# Patient Record
Sex: MALE | Race: WHITE | Employment: UNEMPLOYED | URBAN - NONMETROPOLITAN AREA
[De-identification: names, ages, dates, MRNs, and addresses within clinical notes are randomized per-mention and may not be internally consistent; named-entity substitution may affect disease eponyms.]

---

## 2019-11-25 ENCOUNTER — OFFICE VISIT (OUTPATIENT)
Dept: PRIMARY CARE CLINIC | Age: 54
End: 2019-11-25
Payer: COMMERCIAL

## 2019-11-25 VITALS
WEIGHT: 315 LBS | SYSTOLIC BLOOD PRESSURE: 136 MMHG | HEART RATE: 107 BPM | DIASTOLIC BLOOD PRESSURE: 82 MMHG | BODY MASS INDEX: 46.65 KG/M2 | HEIGHT: 69 IN | OXYGEN SATURATION: 98 % | RESPIRATION RATE: 16 BRPM

## 2019-11-25 DIAGNOSIS — Z00.00 WELLNESS EXAMINATION: Primary | ICD-10-CM

## 2019-11-25 DIAGNOSIS — M25.562 ACUTE PAIN OF LEFT KNEE: ICD-10-CM

## 2019-11-25 DIAGNOSIS — M25.571 CHRONIC PAIN OF BOTH ANKLES: ICD-10-CM

## 2019-11-25 DIAGNOSIS — M25.572 CHRONIC PAIN OF BOTH ANKLES: ICD-10-CM

## 2019-11-25 DIAGNOSIS — G89.29 CHRONIC PAIN OF BOTH ANKLES: ICD-10-CM

## 2019-11-25 PROCEDURE — 96160 PT-FOCUSED HLTH RISK ASSMT: CPT | Performed by: FAMILY MEDICINE

## 2019-11-25 PROCEDURE — 99213 OFFICE O/P EST LOW 20 MIN: CPT | Performed by: FAMILY MEDICINE

## 2019-11-25 PROCEDURE — 99396 PREV VISIT EST AGE 40-64: CPT | Performed by: FAMILY MEDICINE

## 2019-11-25 ASSESSMENT — PATIENT HEALTH QUESTIONNAIRE - PHQ9
SUM OF ALL RESPONSES TO PHQ QUESTIONS 1-9: 18
3. TROUBLE FALLING OR STAYING ASLEEP: 3
4. FEELING TIRED OR HAVING LITTLE ENERGY: 3
7. TROUBLE CONCENTRATING ON THINGS, SUCH AS READING THE NEWSPAPER OR WATCHING TELEVISION: 0
8. MOVING OR SPEAKING SO SLOWLY THAT OTHER PEOPLE COULD HAVE NOTICED. OR THE OPPOSITE, BEING SO FIGETY OR RESTLESS THAT YOU HAVE BEEN MOVING AROUND A LOT MORE THAN USUAL: 0
5. POOR APPETITE OR OVEREATING: 3
SUM OF ALL RESPONSES TO PHQ QUESTIONS 1-9: 18
2. FEELING DOWN, DEPRESSED OR HOPELESS: 3
1. LITTLE INTEREST OR PLEASURE IN DOING THINGS: 3
10. IF YOU CHECKED OFF ANY PROBLEMS, HOW DIFFICULT HAVE THESE PROBLEMS MADE IT FOR YOU TO DO YOUR WORK, TAKE CARE OF THINGS AT HOME, OR GET ALONG WITH OTHER PEOPLE: 0
9. THOUGHTS THAT YOU WOULD BE BETTER OFF DEAD, OR OF HURTING YOURSELF: 0
6. FEELING BAD ABOUT YOURSELF - OR THAT YOU ARE A FAILURE OR HAVE LET YOURSELF OR YOUR FAMILY DOWN: 3
SUM OF ALL RESPONSES TO PHQ9 QUESTIONS 1 & 2: 6

## 2022-10-28 NOTE — PROGRESS NOTES
NEW PRIMARY CARE VISIT    10/31/22  Name: Gian Robertson   : 1965   Age: 49 y.o.   Sex: male        Assessment & Plan:     Problem List Items Addressed This Visit          Circulatory    Elevated blood pressure reading     No personal or family history HTN  Counseled on weight loss  Defer treatment at this time  Patient would be interested in lifestyle changes first  Recheck next visit            Digestive    Chronic diarrhea     Associated with urgency and sometimes incontinence  Check labs  Failed Colestid in past  Referral back to GI         Relevant Orders    External Referral To Gastroenterology    Comprehensive Metabolic Panel, Fasting    Lipase    CBC    TSH       Nervous and Auditory    Chronic right-sided low back pain with left-sided sciatica     History of CSI  Interested in seeing pain management after workup for abdominal pain            Musculoskeletal and Integument    Rosacea     Patient to call with medication name for refill         Relevant Orders    Mikhail Rodriguez DO, Dermatology, [place] ([facility])    Skin lesion of face     New, growing  Referral to derm for biopsy         Relevant Orders    Mikhail Rodriguez DO, Dermatology, [place] ([facility])       Other    Chronic LUQ pain     Chronic bloating  New LUQ pain for past several months  Check labs  Given history of chronic diarrhea and concern for gastritis/PUD/hiatal hernia, referral to GI  Trial omeprazole 40 mg dialy         Relevant Medications    omeprazole (PRILOSEC) 40 MG delayed release capsule    Other Relevant Orders    External Referral To Gastroenterology    Comprehensive Metabolic Panel, Fasting    Lipase    CBC    History of colon polyps     Repeat colonoscopy due          Relevant Orders    External Referral To Gastroenterology    Class 3 severe obesity due to excess calories without serious comorbidity with body mass index (BMI) of 50.0 to 59.9 in [facility])     Patient would like to discuss weight further once his abdominal pain is worked up  Check screening labs         Relevant Orders    Comprehensive Metabolic Panel, Fasting    Lipid, Fasting    Hemoglobin A1C    Cigarette nicotine dependence, uncomplicated     Cut down to 1ppd  Discuss cessation further after workup for abdominal pain  Check CT lung cancer screening         Relevant Orders    DC VISIT TO DISCUSS LUNG CA SCREEN W LDCT (Completed)    CT Lung Screen (Annual)    Left leg swelling     Chronic with skin changes  Unable to fully examine today due to patient's pants  Suspect venous insufficiency  Referral to derm  Consider referral to wound care  Discussed need for weight loss          Other Visit Diagnoses       Encounter to establish care with new doctor    -  Primary    History reviewed and updated    Screening for lung cancer        Relevant Orders    DC VISIT TO DISCUSS LUNG CA SCREEN W LDCT (Completed)    CT Lung Screen (Annual)    Screening for lipid disorders        Relevant Orders    Lipid, Fasting    Screening for diabetes mellitus (DM)        Relevant Orders    Comprehensive Metabolic Panel, Fasting    Hemoglobin A1C    Hyperglycemia        Relevant Orders    Hemoglobin A1C    Need for influenza vaccination        Relevant Orders    Influenza, FLUCELVAX, (age 10 mo+), IM, PF, 0.5 mL (Completed)          Counseled patient regarding above diagnosis, including possible risks and complications, especially if left uncontrolled. Counseled patient as appropriate and relevant regarding any possible side effects, risks, and alternatives to treatment; the patient verbalizes understanding, and is in agreement with the plan as detailed above. All educational materials and instructions were discussed and included on the After Visit Summary. All questions answered to the patient's satisfaction. The patient was advised to call or send [employer] message for any concerns prior to next appointment. Return in about 3 months (around 1/31/2023) for follow-up.     46 minutes was spent precharting, face-to-face with patient, and documenting on day of encounter. Subjective:     Chief Complaint   Patient presents with    New Patient       Patient needs new PCP. Patient reports \"stomach issues\". Has had bloating intermittently for past 4 hours. Now having intermittent LUQ abdominal pain radiating into middle back. Pain is aching in nature. Does not burn. Denies reflux. Pain gets worse with hunger but does not have any specific trigger. Usual pattern is early in the morning, gets better as day goes on. No relieving factors. Saw GI in past for colon cancer screening. Was also prescribed Colestid for chronic diarrhea. Did not help. Reports new lesion left face. Had derm in past for rosacea. Was on cream, cannot remember name. Review of Systems   Constitutional:  Negative for chills, diaphoresis, fatigue and fever. HENT:  Negative for trouble swallowing. Eyes:  Negative for visual disturbance. Respiratory:  Negative for shortness of breath. Cardiovascular:  Positive for leg swelling (chronic L>R). Negative for chest pain. Gastrointestinal:  Positive for abdominal distention, abdominal pain, diarrhea and nausea. Negative for blood in stool, constipation and vomiting. Positive for chronic stool urgency and incontinence. Genitourinary:  Negative for decreased urine volume, difficulty urinating and dysuria. Neurological:  Negative for light-headedness and headaches. Medical History:   History reviewed and updated as needed.     Patient Active Problem List   Diagnosis    Chronic right-sided low back pain with left-sided sciatica    Chronic LUQ pain    Chronic diarrhea    History of colon polyps    Rosacea    Class 3 severe obesity due to excess calories without serious comorbidity with body mass index (BMI) of 50.0 to 59.9 in adult [facility])    Skin lesion of face     Past Medical History:   Diagnosis Date    Back problem      Past Surgical History: Procedure Laterality Date    ANKLE SURGERY Right     due to instability    MOUTH SURGERY       Family History   Problem Relation Age of Onset    Kidney Disease Daughter     No Known Problems Son     Cancer Maternal Aunt      Medications:     Current Outpatient Medications:     albuterol (PROAIR HFA) 108 (90 BASE) MCG/ACT inhaler, Inhale 2 puffs into the lungs every 6 hours as needed for Wheezing for up to 7 days. , Disp: 1 Inhaler, Rfl: 0    naproxen (NAPROSYN) 500 MG tablet, Take 1 tablet by mouth 2 times daily (with meals) for 3 days. , Disp: 20 tablet, Rfl: 0    Allergies:   No Known Allergies    Social History:     Social History     Socioeconomic History    Marital status:      Spouse name: Not on file    Number of children: Not on file    Years of education: Not on file    Highest education level: Not on file   Occupational History    Not on file   Tobacco Use    Smoking status: Every Day     Packs/day: 1.00     Types: Cigarettes    Smokeless tobacco: Former   Substance and Sexual Activity    Alcohol use: Yes     Comment: rare    Drug use: No    Sexual activity: Not on file   Other Topics Concern    Not on file   Social History Narrative    Not on file     Social Determinants of Health     Financial Resource Strain: Not on file   Food Insecurity: Not on file   Transportation Needs: Not on file   Physical Activity: Not on file   Stress: Not on file   Social Connections: Not on file   Intimate Partner Violence: Not on file   Housing Stability: Not on file     Physical Exam:     Vitals:    10/31/22 0826 10/31/22 0916   BP: (!) 158/92 (!) 142/88   Site:  Left Upper Arm   Position:  Sitting   Cuff Size:  Large Adult   Pulse: (!) 124 84   Resp: 20    Temp: 98.1 °F (36.7 °C)    TempSrc: Temporal    SpO2: 95%    Height: 5' 9\" (1.753 m)      BP Readings from Last 3 Encounters:   10/31/22 (!) 142/88   11/25/19 136/82     Wt Readings from Last 3 Encounters:   11/25/19 (!) 354 lb (160.6 kg)      Physical Exam  Vitals and nursing note reviewed. Constitutional:       General: He is not in acute distress. Appearance: Normal appearance. He is obese. He is not ill-appearing or diaphoretic. Cardiovascular:      Rate and Rhythm: Normal rate and regular rhythm. Heart sounds: Normal heart sounds. Pulmonary:      Effort: Pulmonary effort is normal. No respiratory distress. Breath sounds: Normal breath sounds. Abdominal:      General: Abdomen is protuberant. Bowel sounds are normal. There is distension. Palpations: Abdomen is soft. There is no hepatomegaly, splenomegaly or mass. Tenderness: There is abdominal tenderness in the periumbilical area. There is no guarding or rebound. Hernia: No hernia is present. Musculoskeletal:      Right lower le+ Edema present. Left lower le+ Edema present. Comments: Left lower extremity with swelling and skin thickening mid-leg above knee. Skin:     General: Skin is warm and dry. Comments: 1 cm macule left temple. Neurological:      Mental Status: He is alert and oriented to person, place, and time. Psychiatric:         Mood and Affect: Mood normal.         Behavior: Behavior normal.     Testing:     Orders Placed This Encounter   Procedures    CT Lung Screen (Annual)     Age: Patient is 49 y.o.     Smoking History: Social History    Tobacco Use      Smoking status: Every Day        Packs/day: 1.00        Years: 40.00        Pack years: 40        Types: Cigarettes      Smokeless tobacco: Former      Tobacco comments: total smoking history 40 years; 2ppd for 10 years; currently down to 1ppd    Alcohol use: Not Currently      Comment: rare    Drug use: Yes      Types: Marijuana (Weed)   Pack years: 40    Date of last lung cancer screening: No previous lung cancer screening exam     Standing Status:   Future     Standing Expiration Date:   2024     Scheduling Instructions:      [facility]     Order Specific Question:   Is there documentation of shared decision making? Answer:   Yes     Order Specific Question:   Is this a low dose CT or a routine CT? Answer:   Low Dose CT [1]     Order Specific Question:   Is this the first (baseline) CT or an annual exam?     Answer:   Baseline [1]     Order Specific Question:   Does the patient show any signs or symptoms of lung cancer? Answer:   No     Order Specific Question:   Smoking Status? Answer:   Every Day [1]     Order Specific Question:   Smoking packs per day? Answer:   1     Order Specific Question:   Years smoking? Answer:   40    Influenza, FLUCELVAX, (age 10 mo+), IM, PF, 0.5 mL    Comprehensive Metabolic Panel, Fasting     Standing Status:   Future     Standing Expiration Date:   10/31/2023    Lipid, Fasting     Standing Status:   Future     Standing Expiration Date:   10/31/2023    Lipase     Standing Status:   Future     Standing Expiration Date:   10/31/2023    CBC     Standing Status:   Future     Standing Expiration Date:   10/31/2023    TSH     Standing Status:   Future     Standing Expiration Date:   10/31/2023    Hemoglobin A1C     Standing Status:   Future     Standing Expiration Date:   10/31/2023    External Referral To Gastroenterology     Referral Priority:   Routine     Referral Type:   Eval and Treat     Referral Reason:   Specialty Services Required     Requested Specialty:   Gastroenterology     Number of Visits Requested:   1    Renay Lin DO, [facility] ([facility])     Referral Priority:   Routine     Referral Type:   Eval and Treat     Referral Reason:   Specialty Services Required     Referred to Provider:   Noy Dill DO     Requested Specialty:   Dermatology     Number of Visits Requested:   1    [place] VISIT TO DISCUSS LUNG CA SCREEN W LDCT     No results found for this or any previous visit (from the past 24 hour(s)).     Discussed with the patient the current USPSTF guidelines released March 9, 2021 for screening for lung cancer. For adults aged 48 to [de-identified] years who have a 20 pack-year smoking history and currently smoke or have quit within the past 15 years the grade B recommendation is to:  Screen for lung cancer with low-dose computed tomography (LDCT) every year. Stop screening once a person has not smoked for 15 years or has a health problem that limits life expectancy or the ability to have lung surgery. The patient  reports that he has been smoking cigarettes. He has a 40.00 pack-year smoking history. He has quit using smokeless tobacco.. Discussed with patient the risks and benefits of screening, including over-diagnosis, false positive rate, and total radiation exposure. The patient currently exhibits no signs or symptoms suggestive of lung cancer. Discussed with patient the importance of compliance with yearly annual lung cancer screenings and willingness to undergo diagnosis and treatment if screening scan is positive. In addition, the patient was counseled regarding the importance of remaining smoke free and/or total smoking cessation.     Also reviewed the following if the patient has Medicare that as of February 10, 2022, Medicare only covers LDCT screening in patients aged 51-72 with at least a 20 pack-year smoking history who currently smoke or have quit in the last 15 years

## 2022-10-31 ENCOUNTER — OFFICE VISIT (OUTPATIENT)
Dept: PRIMARY CARE CLINIC | Age: 57
End: 2022-10-31
Payer: COMMERCIAL

## 2022-10-31 VITALS
HEART RATE: 84 BPM | SYSTOLIC BLOOD PRESSURE: 142 MMHG | OXYGEN SATURATION: 95 % | BODY MASS INDEX: 52.28 KG/M2 | DIASTOLIC BLOOD PRESSURE: 88 MMHG | HEIGHT: 69 IN | TEMPERATURE: 98.1 F | RESPIRATION RATE: 20 BRPM

## 2022-10-31 DIAGNOSIS — Z13.1 SCREENING FOR DIABETES MELLITUS (DM): ICD-10-CM

## 2022-10-31 DIAGNOSIS — L71.9 ROSACEA: ICD-10-CM

## 2022-10-31 DIAGNOSIS — G89.29 CHRONIC RIGHT-SIDED LOW BACK PAIN WITH LEFT-SIDED SCIATICA: ICD-10-CM

## 2022-10-31 DIAGNOSIS — R03.0 ELEVATED BLOOD PRESSURE READING: ICD-10-CM

## 2022-10-31 DIAGNOSIS — Z76.89 ENCOUNTER TO ESTABLISH CARE WITH NEW DOCTOR: Primary | ICD-10-CM

## 2022-10-31 DIAGNOSIS — Z12.2 SCREENING FOR LUNG CANCER: ICD-10-CM

## 2022-10-31 DIAGNOSIS — E66.01 CLASS 3 SEVERE OBESITY DUE TO EXCESS CALORIES WITHOUT SERIOUS COMORBIDITY WITH BODY MASS INDEX (BMI) OF 50.0 TO 59.9 IN ADULT (HCC): ICD-10-CM

## 2022-10-31 DIAGNOSIS — Z23 NEED FOR INFLUENZA VACCINATION: ICD-10-CM

## 2022-10-31 DIAGNOSIS — F17.210 CIGARETTE NICOTINE DEPENDENCE, UNCOMPLICATED: ICD-10-CM

## 2022-10-31 DIAGNOSIS — M54.42 CHRONIC RIGHT-SIDED LOW BACK PAIN WITH LEFT-SIDED SCIATICA: ICD-10-CM

## 2022-10-31 DIAGNOSIS — M79.89 LEFT LEG SWELLING: ICD-10-CM

## 2022-10-31 DIAGNOSIS — Z86.010 HISTORY OF COLON POLYPS: ICD-10-CM

## 2022-10-31 DIAGNOSIS — R10.12 CHRONIC LUQ PAIN: ICD-10-CM

## 2022-10-31 DIAGNOSIS — R73.9 HYPERGLYCEMIA: ICD-10-CM

## 2022-10-31 DIAGNOSIS — G89.29 CHRONIC LUQ PAIN: ICD-10-CM

## 2022-10-31 DIAGNOSIS — Z13.220 SCREENING FOR LIPID DISORDERS: ICD-10-CM

## 2022-10-31 DIAGNOSIS — L98.9 SKIN LESION OF FACE: ICD-10-CM

## 2022-10-31 DIAGNOSIS — K52.9 CHRONIC DIARRHEA: ICD-10-CM

## 2022-10-31 PROBLEM — Z86.0100 HISTORY OF COLON POLYPS: Status: ACTIVE | Noted: 2022-10-31

## 2022-10-31 PROBLEM — E66.813 CLASS 3 SEVERE OBESITY DUE TO EXCESS CALORIES WITHOUT SERIOUS COMORBIDITY WITH BODY MASS INDEX (BMI) OF 50.0 TO 59.9 IN ADULT (HCC): Status: ACTIVE | Noted: 2022-10-31

## 2022-10-31 PROCEDURE — 90471 IMMUNIZATION ADMIN: CPT | Performed by: STUDENT IN AN ORGANIZED HEALTH CARE EDUCATION/TRAINING PROGRAM

## 2022-10-31 PROCEDURE — G8482 FLU IMMUNIZE ORDER/ADMIN: HCPCS | Performed by: STUDENT IN AN ORGANIZED HEALTH CARE EDUCATION/TRAINING PROGRAM

## 2022-10-31 PROCEDURE — 4004F PT TOBACCO SCREEN RCVD TLK: CPT | Performed by: STUDENT IN AN ORGANIZED HEALTH CARE EDUCATION/TRAINING PROGRAM

## 2022-10-31 PROCEDURE — 90674 CCIIV4 VAC NO PRSV 0.5 ML IM: CPT | Performed by: STUDENT IN AN ORGANIZED HEALTH CARE EDUCATION/TRAINING PROGRAM

## 2022-10-31 PROCEDURE — G0296 VISIT TO DETERM LDCT ELIG: HCPCS | Performed by: STUDENT IN AN ORGANIZED HEALTH CARE EDUCATION/TRAINING PROGRAM

## 2022-10-31 PROCEDURE — 3017F COLORECTAL CA SCREEN DOC REV: CPT | Performed by: STUDENT IN AN ORGANIZED HEALTH CARE EDUCATION/TRAINING PROGRAM

## 2022-10-31 PROCEDURE — G8427 DOCREV CUR MEDS BY ELIG CLIN: HCPCS | Performed by: STUDENT IN AN ORGANIZED HEALTH CARE EDUCATION/TRAINING PROGRAM

## 2022-10-31 PROCEDURE — G8417 CALC BMI ABV UP PARAM F/U: HCPCS | Performed by: STUDENT IN AN ORGANIZED HEALTH CARE EDUCATION/TRAINING PROGRAM

## 2022-10-31 PROCEDURE — 99215 OFFICE O/P EST HI 40 MIN: CPT | Performed by: STUDENT IN AN ORGANIZED HEALTH CARE EDUCATION/TRAINING PROGRAM

## 2022-10-31 RX ORDER — OMEPRAZOLE 40 MG/1
40 CAPSULE, DELAYED RELEASE ORAL
Qty: 30 CAPSULE | Refills: 2 | Status: SHIPPED | OUTPATIENT
Start: 2022-10-31

## 2022-10-31 ASSESSMENT — ENCOUNTER SYMPTOMS
SHORTNESS OF BREATH: 0
ABDOMINAL PAIN: 1
ABDOMINAL DISTENTION: 1
VOMITING: 0
DIARRHEA: 1
TROUBLE SWALLOWING: 0
NAUSEA: 1
BLOOD IN STOOL: 0
CONSTIPATION: 0

## 2022-10-31 ASSESSMENT — PATIENT HEALTH QUESTIONNAIRE - PHQ9
SUM OF ALL RESPONSES TO PHQ QUESTIONS 1-9: 0
SUM OF ALL RESPONSES TO PHQ QUESTIONS 1-9: 0
SUM OF ALL RESPONSES TO PHQ9 QUESTIONS 1 & 2: 0
1. LITTLE INTEREST OR PLEASURE IN DOING THINGS: 0
DEPRESSION UNABLE TO ASSESS: FUNCTIONAL CAPACITY MOTIVATION LIMITS ACCURACY
2. FEELING DOWN, DEPRESSED OR HOPELESS: 0
SUM OF ALL RESPONSES TO PHQ QUESTIONS 1-9: 0
SUM OF ALL RESPONSES TO PHQ QUESTIONS 1-9: 0

## 2022-10-31 NOTE — ASSESSMENT & PLAN NOTE
No personal or family history HTN  Counseled on weight loss  Defer treatment at this time  Patient would be interested in lifestyle changes first  Recheck next visit

## 2022-10-31 NOTE — PATIENT INSTRUCTIONS
[address], [address]   [place], [email or web address]   [id number]  Address: [address], [address] [place] [address] Drive  Phone: [phone]

## 2022-10-31 NOTE — ASSESSMENT & PLAN NOTE
Chronic with skin changes  Unable to fully examine today due to patient's pants  Suspect venous insufficiency  Referral to derm  Consider referral to wound care  Discussed need for weight loss

## 2022-10-31 NOTE — ASSESSMENT & PLAN NOTE
Associated with urgency and sometimes incontinence  Check labs  Failed Colestid in past  Referral back to GI

## 2022-10-31 NOTE — ASSESSMENT & PLAN NOTE
Chronic bloating  New LUQ pain for past several months  Check labs  Given history of chronic diarrhea and concern for gastritis/PUD/hiatal hernia, referral to GI  Trial omeprazole 40 mg giovany

## 2022-10-31 NOTE — ASSESSMENT & PLAN NOTE
Patient would like to discuss weight further once his abdominal pain is worked up  Check screening labs

## 2022-10-31 NOTE — ASSESSMENT & PLAN NOTE
Cut down to 1ppd  Discuss cessation further after workup for abdominal pain  Check CT lung cancer screening

## 2022-11-02 ENCOUNTER — TELEPHONE (OUTPATIENT)
Dept: PRIMARY CARE CLINIC | Age: 57
End: 2022-11-02

## 2022-11-02 DIAGNOSIS — Z13.220 SCREENING FOR LIPID DISORDERS: ICD-10-CM

## 2022-11-02 DIAGNOSIS — R73.9 HYPERGLYCEMIA: ICD-10-CM

## 2022-11-02 DIAGNOSIS — R10.12 CHRONIC LUQ PAIN: ICD-10-CM

## 2022-11-02 DIAGNOSIS — G89.29 CHRONIC LUQ PAIN: ICD-10-CM

## 2022-11-02 DIAGNOSIS — Z13.1 SCREENING FOR DIABETES MELLITUS (DM): ICD-10-CM

## 2022-11-02 DIAGNOSIS — K52.9 CHRONIC DIARRHEA: ICD-10-CM

## 2022-11-02 DIAGNOSIS — E66.01 CLASS 3 SEVERE OBESITY DUE TO EXCESS CALORIES WITHOUT SERIOUS COMORBIDITY WITH BODY MASS INDEX (BMI) OF 50.0 TO 59.9 IN ADULT (HCC): ICD-10-CM

## 2022-11-02 DIAGNOSIS — L71.9 ROSACEA: Primary | ICD-10-CM

## 2022-11-02 LAB
ALBUMIN SERPL-MCNC: 4.2 G/DL (ref 3.5–5.2)
ALP BLD-CCNC: 86 U/L (ref 40–129)
ALT SERPL-CCNC: 13 U/L (ref 0–40)
ANION GAP SERPL CALCULATED.3IONS-SCNC: 11 MMOL/L (ref 7–16)
AST SERPL-CCNC: 21 U/L (ref 0–39)
BILIRUB SERPL-MCNC: 0.2 MG/DL (ref 0–1.2)
BUN BLDV-MCNC: 11 MG/DL (ref 6–20)
CALCIUM SERPL-MCNC: 9.4 MG/DL (ref 8.6–10.2)
CHLORIDE BLD-SCNC: 100 MMOL/L (ref 98–107)
CHOLESTEROL, FASTING: 139 MG/DL (ref 0–199)
CO2: 26 MMOL/L (ref 22–29)
CREAT SERPL-MCNC: 0.9 MG/DL (ref 0.7–1.2)
GFR SERPL CREATININE-BSD FRML MDRD: >60 ML/MIN/1.73
GLUCOSE FASTING: 89 MG/DL (ref 74–99)
HBA1C MFR BLD: 6 % (ref 4–5.6)
HCT VFR BLD CALC: 46.7 % (ref 37–54)
HDLC SERPL-MCNC: 44 MG/DL
HEMOGLOBIN: 14.9 G/DL (ref 12.5–16.5)
LDL CHOLESTEROL CALCULATED: 78 MG/DL (ref 0–99)
LIPASE: 29 U/L (ref 13–60)
MCH RBC QN AUTO: 28.9 PG (ref 26–35)
MCHC RBC AUTO-ENTMCNC: 31.9 % (ref 32–34.5)
MCV RBC AUTO: 90.7 FL (ref 80–99.9)
PDW BLD-RTO: 13.2 FL (ref 11.5–15)
PLATELET # BLD: 293 E9/L (ref 130–450)
PMV BLD AUTO: 10 FL (ref 7–12)
POTASSIUM SERPL-SCNC: 4.9 MMOL/L (ref 3.5–5)
RBC # BLD: 5.15 E12/L (ref 3.8–5.8)
SODIUM BLD-SCNC: 137 MMOL/L (ref 132–146)
TOTAL PROTEIN: 7.3 G/DL (ref 6.4–8.3)
TRIGLYCERIDE, FASTING: 87 MG/DL (ref 0–149)
TSH SERPL DL<=0.05 MIU/L-ACNC: 2.47 UIU/ML (ref 0.27–4.2)
VLDLC SERPL CALC-MCNC: 17 MG/DL
WBC # BLD: 8.5 E9/L (ref 4.5–11.5)

## 2022-11-03 NOTE — TELEPHONE ENCOUNTER
Please call patient to let him know that insurance formulary must have changed. Topical minocycline is not covered on his insurance currently. Metronidazole gel, also a topical antibiotic, is covered if he would like to try this. If this does not work for him, then we can apply for a prior authorization for the minocycline.

## 2022-11-04 NOTE — TELEPHONE ENCOUNTER
Patient does not want to try the Metronidazole gel. He is worried it will be greasy.  He stated he is waiting on the referral to a dermatologist and hopefully they will be able to find something else

## 2023-01-27 ENCOUNTER — OFFICE VISIT (OUTPATIENT)
Dept: PRIMARY CARE CLINIC | Age: 58
End: 2023-01-27
Payer: COMMERCIAL

## 2023-01-27 VITALS
SYSTOLIC BLOOD PRESSURE: 146 MMHG | HEIGHT: 69 IN | TEMPERATURE: 98.7 F | HEART RATE: 88 BPM | OXYGEN SATURATION: 98 % | WEIGHT: 315 LBS | RESPIRATION RATE: 20 BRPM | BODY MASS INDEX: 46.65 KG/M2 | DIASTOLIC BLOOD PRESSURE: 88 MMHG

## 2023-01-27 DIAGNOSIS — L71.9 ROSACEA: ICD-10-CM

## 2023-01-27 DIAGNOSIS — R73.03 PREDIABETES: ICD-10-CM

## 2023-01-27 DIAGNOSIS — K76.0 FATTY LIVER: ICD-10-CM

## 2023-01-27 DIAGNOSIS — R10.12 CHRONIC LUQ PAIN: ICD-10-CM

## 2023-01-27 DIAGNOSIS — Z86.010 HISTORY OF COLON POLYPS: ICD-10-CM

## 2023-01-27 DIAGNOSIS — E66.01 CLASS 3 SEVERE OBESITY DUE TO EXCESS CALORIES WITHOUT SERIOUS COMORBIDITY WITH BODY MASS INDEX (BMI) OF 50.0 TO 59.9 IN ADULT (HCC): ICD-10-CM

## 2023-01-27 DIAGNOSIS — G89.29 CHRONIC LUQ PAIN: ICD-10-CM

## 2023-01-27 DIAGNOSIS — I10 ESSENTIAL HYPERTENSION: Primary | ICD-10-CM

## 2023-01-27 DIAGNOSIS — K52.9 CHRONIC DIARRHEA: ICD-10-CM

## 2023-01-27 PROCEDURE — 3074F SYST BP LT 130 MM HG: CPT | Performed by: STUDENT IN AN ORGANIZED HEALTH CARE EDUCATION/TRAINING PROGRAM

## 2023-01-27 PROCEDURE — G8427 DOCREV CUR MEDS BY ELIG CLIN: HCPCS | Performed by: STUDENT IN AN ORGANIZED HEALTH CARE EDUCATION/TRAINING PROGRAM

## 2023-01-27 PROCEDURE — G8417 CALC BMI ABV UP PARAM F/U: HCPCS | Performed by: STUDENT IN AN ORGANIZED HEALTH CARE EDUCATION/TRAINING PROGRAM

## 2023-01-27 PROCEDURE — G8482 FLU IMMUNIZE ORDER/ADMIN: HCPCS | Performed by: STUDENT IN AN ORGANIZED HEALTH CARE EDUCATION/TRAINING PROGRAM

## 2023-01-27 PROCEDURE — 99214 OFFICE O/P EST MOD 30 MIN: CPT | Performed by: STUDENT IN AN ORGANIZED HEALTH CARE EDUCATION/TRAINING PROGRAM

## 2023-01-27 PROCEDURE — 3017F COLORECTAL CA SCREEN DOC REV: CPT | Performed by: STUDENT IN AN ORGANIZED HEALTH CARE EDUCATION/TRAINING PROGRAM

## 2023-01-27 PROCEDURE — 3078F DIAST BP <80 MM HG: CPT | Performed by: STUDENT IN AN ORGANIZED HEALTH CARE EDUCATION/TRAINING PROGRAM

## 2023-01-27 PROCEDURE — 4004F PT TOBACCO SCREEN RCVD TLK: CPT | Performed by: STUDENT IN AN ORGANIZED HEALTH CARE EDUCATION/TRAINING PROGRAM

## 2023-01-27 RX ORDER — METFORMIN HYDROCHLORIDE 500 MG/1
500 TABLET, EXTENDED RELEASE ORAL
Qty: 30 TABLET | Refills: 3 | Status: SHIPPED | OUTPATIENT
Start: 2023-01-27

## 2023-01-27 RX ORDER — AMLODIPINE BESYLATE 5 MG/1
5 TABLET ORAL DAILY
Qty: 30 TABLET | Refills: 3 | Status: SHIPPED | OUTPATIENT
Start: 2023-01-27

## 2023-01-27 ASSESSMENT — ENCOUNTER SYMPTOMS
ABDOMINAL DISTENTION: 1
ABDOMINAL PAIN: 1
DIARRHEA: 1
BLOOD IN STOOL: 0
SHORTNESS OF BREATH: 0
CONSTIPATION: 0
TROUBLE SWALLOWING: 0
NAUSEA: 1
VOMITING: 0

## 2023-01-27 ASSESSMENT — PATIENT HEALTH QUESTIONNAIRE - PHQ9
SUM OF ALL RESPONSES TO PHQ9 QUESTIONS 1 & 2: 2
1. LITTLE INTEREST OR PLEASURE IN DOING THINGS: 1
SUM OF ALL RESPONSES TO PHQ QUESTIONS 1-9: 2
SUM OF ALL RESPONSES TO PHQ QUESTIONS 1-9: 2
DEPRESSION UNABLE TO ASSESS: FUNCTIONAL CAPACITY MOTIVATION LIMITS ACCURACY
SUM OF ALL RESPONSES TO PHQ QUESTIONS 1-9: 2
SUM OF ALL RESPONSES TO PHQ QUESTIONS 1-9: 2
2. FEELING DOWN, DEPRESSED OR HOPELESS: 1

## 2023-01-27 NOTE — PROGRESS NOTES
ESTABLISHED PRIMARY CARE VISIT    23  Name: Loretto Homans   : 1965   Age: 56 y.o. Sex: male        Assessment & Plan:     Problem List Items Addressed This Visit          Circulatory    Essential hypertension - Primary     New diagnosis, remains elevated  Start amlodipine 5 mg daily         Relevant Medications    amLODIPine (NORVASC) 5 MG tablet       Digestive    Chronic diarrhea     Following with GI  Trial low FODMAP diet         Fatty liver     Following with GI            Musculoskeletal and Integument    Rosacea     Topical minocycline not covered  Trial topical metronidazole            Other    Chronic LUQ pain     Following with gastro  Trial low FODMAP diet         History of colon polyps     Following with GI  Colonoscopy due          Class 3 severe obesity due to excess calories without serious comorbidity with body mass index (BMI) of 50.0 to 59.9 in adult ([facility] [address]     Start metformin  Counseled on lifestyle changes         Relevant Medications    metFORMIN (GLUCOPHAGE XR) 500 MG extended release tablet    Prediabetes     Start metformin for weight loss         Relevant Medications    metFORMIN (GLUCOPHAGE XR) 500 MG extended release tablet     Counseled patient regarding above diagnosis, including possible risks and complications, especially if left uncontrolled. Counseled patient as appropriate and relevant regarding any possible side effects, risks, and alternatives to treatment; the patient verbalizes understanding, and is in agreement with the plan as detailed above. All educational materials and instructions were discussed and included on the After Visit Summary. All questions answered to the patient's satisfaction. The patient was advised to call or send [employer] message for any concerns prior to next appointment. Return in about 3 months (around 2023).     Subjective:     Chief Complaint   Patient presents with    Follow-up     Patient returns for follow-up  Had swallow study and US  Has follow-up next month to discuss results    Review of Systems   Constitutional:  Negative for chills, diaphoresis, fatigue and fever. HENT:  Negative for trouble swallowing. Eyes:  Negative for visual disturbance. Respiratory:  Negative for shortness of breath. Cardiovascular:  Positive for leg swelling (chronic left upper leg). Negative for chest pain. Gastrointestinal:  Positive for abdominal distention, abdominal pain (left upper and lower), diarrhea and nausea (rare). Negative for blood in stool, constipation and vomiting. Positive for chronic stool urgency and incontinence. Genitourinary:  Negative for decreased urine volume, difficulty urinating and dysuria. Neurological:  Negative for light-headedness and headaches. Medications:     Current Outpatient Medications:     omeprazole (PRILOSEC) 40 MG delayed release capsule, Take 1 capsule by mouth every morning (before breakfast), Disp: 30 capsule, Rfl: 2    Minocycline HCl Micronized 1.5 % FOAM, Express a small (cherry-sized) amount to fingertips and apply a thin layer to all areas of the face once daily; may repeat until entire face is covered. (Patient not taking: Reported on 1/27/2023), Disp: 30 g, Rfl: 0    Physical Exam:     Vitals:    01/27/23 1018 01/27/23 1032   BP: (!) 150/94 (!) 146/94   Pulse: 88    Resp: 20    Temp: 98.7 °F (37.1 °C)    TempSrc: Temporal    SpO2: 98%    Weight: (!) 400 lb (181.4 kg)    Height: 5' 9\" (1.753 m)      BP Readings from Last 3 Encounters:   01/27/23 (!) 146/94   10/31/22 (!) 142/88   11/25/19 136/82     Wt Readings from Last 3 Encounters:   01/27/23 (!) 400 lb (181.4 kg)   11/25/19 (!) 354 lb (160.6 kg)     Physical Exam  Vitals and nursing note reviewed. Constitutional:       General: He is not in acute distress. Appearance: Normal appearance. He is obese. He is not ill-appearing or diaphoretic.    Cardiovascular:      Rate and Rhythm: Normal rate and regular rhythm. Heart sounds: Normal heart sounds. Pulmonary:      Effort: Pulmonary effort is normal. No respiratory distress. Breath sounds: Normal breath sounds. Musculoskeletal:      Right lower leg: No edema. Left lower leg: No edema. Skin:     General: Skin is warm and dry. Neurological:      Mental Status: He is alert and oriented to person, place, and time. Testing:   No orders of the defined types were placed in this encounter. No results found for this or any previous visit (from the past 24 hour(s)).

## 2023-01-27 NOTE — PATIENT INSTRUCTIONS
Start amlodipine for blood pressure first.   Then in 1-2 weeks, start metformin XR for prediabetes and weight to make sure no side effects from either medication.

## 2023-05-05 ENCOUNTER — OFFICE VISIT (OUTPATIENT)
Dept: PRIMARY CARE CLINIC | Age: 58
End: 2023-05-05
Payer: COMMERCIAL

## 2023-05-05 VITALS
DIASTOLIC BLOOD PRESSURE: 84 MMHG | SYSTOLIC BLOOD PRESSURE: 130 MMHG | WEIGHT: 315 LBS | HEART RATE: 96 BPM | BODY MASS INDEX: 46.65 KG/M2 | HEIGHT: 69 IN | OXYGEN SATURATION: 97 % | RESPIRATION RATE: 20 BRPM | TEMPERATURE: 98.3 F

## 2023-05-05 DIAGNOSIS — K52.9 CHRONIC DIARRHEA: ICD-10-CM

## 2023-05-05 DIAGNOSIS — R73.03 PREDIABETES: ICD-10-CM

## 2023-05-05 DIAGNOSIS — L21.9 SEBORRHEIC DERMATITIS: ICD-10-CM

## 2023-05-05 DIAGNOSIS — E66.01 CLASS 3 SEVERE OBESITY DUE TO EXCESS CALORIES WITHOUT SERIOUS COMORBIDITY WITH BODY MASS INDEX (BMI) OF 50.0 TO 59.9 IN ADULT (HCC): ICD-10-CM

## 2023-05-05 DIAGNOSIS — I10 ESSENTIAL HYPERTENSION: Primary | ICD-10-CM

## 2023-05-05 LAB — HBA1C MFR BLD: 6.2 %

## 2023-05-05 PROCEDURE — 4004F PT TOBACCO SCREEN RCVD TLK: CPT | Performed by: STUDENT IN AN ORGANIZED HEALTH CARE EDUCATION/TRAINING PROGRAM

## 2023-05-05 PROCEDURE — 3078F DIAST BP <80 MM HG: CPT | Performed by: STUDENT IN AN ORGANIZED HEALTH CARE EDUCATION/TRAINING PROGRAM

## 2023-05-05 PROCEDURE — G8427 DOCREV CUR MEDS BY ELIG CLIN: HCPCS | Performed by: STUDENT IN AN ORGANIZED HEALTH CARE EDUCATION/TRAINING PROGRAM

## 2023-05-05 PROCEDURE — G8417 CALC BMI ABV UP PARAM F/U: HCPCS | Performed by: STUDENT IN AN ORGANIZED HEALTH CARE EDUCATION/TRAINING PROGRAM

## 2023-05-05 PROCEDURE — 99214 OFFICE O/P EST MOD 30 MIN: CPT | Performed by: STUDENT IN AN ORGANIZED HEALTH CARE EDUCATION/TRAINING PROGRAM

## 2023-05-05 PROCEDURE — 83036 HEMOGLOBIN GLYCOSYLATED A1C: CPT | Performed by: STUDENT IN AN ORGANIZED HEALTH CARE EDUCATION/TRAINING PROGRAM

## 2023-05-05 PROCEDURE — 3074F SYST BP LT 130 MM HG: CPT | Performed by: STUDENT IN AN ORGANIZED HEALTH CARE EDUCATION/TRAINING PROGRAM

## 2023-05-05 PROCEDURE — 3017F COLORECTAL CA SCREEN DOC REV: CPT | Performed by: STUDENT IN AN ORGANIZED HEALTH CARE EDUCATION/TRAINING PROGRAM

## 2023-05-05 RX ORDER — AMLODIPINE BESYLATE 5 MG/1
5 TABLET ORAL DAILY
Qty: 30 TABLET | Refills: 3 | Status: SHIPPED | OUTPATIENT
Start: 2023-05-05

## 2023-05-05 RX ORDER — METFORMIN HYDROCHLORIDE 500 MG/1
500 TABLET, EXTENDED RELEASE ORAL
Qty: 60 TABLET | Refills: 3 | Status: SHIPPED | OUTPATIENT
Start: 2023-05-05

## 2023-05-05 RX ORDER — KETOCONAZOLE 20 MG/G
CREAM TOPICAL
Qty: 60 G | Refills: 0 | Status: SHIPPED | OUTPATIENT
Start: 2023-05-05

## 2023-05-05 SDOH — ECONOMIC STABILITY: FOOD INSECURITY: WITHIN THE PAST 12 MONTHS, THE FOOD YOU BOUGHT JUST DIDN'T LAST AND YOU DIDN'T HAVE MONEY TO GET MORE.: NEVER TRUE

## 2023-05-05 SDOH — ECONOMIC STABILITY: INCOME INSECURITY: HOW HARD IS IT FOR YOU TO PAY FOR THE VERY BASICS LIKE FOOD, HOUSING, MEDICAL CARE, AND HEATING?: NOT HARD AT ALL

## 2023-05-05 SDOH — ECONOMIC STABILITY: FOOD INSECURITY: WITHIN THE PAST 12 MONTHS, YOU WORRIED THAT YOUR FOOD WOULD RUN OUT BEFORE YOU GOT MONEY TO BUY MORE.: NEVER TRUE

## 2023-05-05 SDOH — ECONOMIC STABILITY: HOUSING INSECURITY
IN THE LAST 12 MONTHS, WAS THERE A TIME WHEN YOU DID NOT HAVE A STEADY PLACE TO SLEEP OR SLEPT IN A SHELTER (INCLUDING NOW)?: NO

## 2023-06-05 PROBLEM — L21.9 SEBORRHEIC DERMATITIS: Status: ACTIVE | Noted: 2023-06-05

## 2023-06-05 ASSESSMENT — ENCOUNTER SYMPTOMS
ABDOMINAL PAIN: 1
NAUSEA: 1
SHORTNESS OF BREATH: 0
VOMITING: 0
BLOOD IN STOOL: 0
TROUBLE SWALLOWING: 0
DIARRHEA: 1
CONSTIPATION: 0
ABDOMINAL DISTENTION: 1

## 2023-08-07 ENCOUNTER — OFFICE VISIT (OUTPATIENT)
Dept: PRIMARY CARE CLINIC | Age: 58
End: 2023-08-07
Payer: COMMERCIAL

## 2023-08-07 VITALS
BODY MASS INDEX: 46.65 KG/M2 | DIASTOLIC BLOOD PRESSURE: 84 MMHG | OXYGEN SATURATION: 96 % | TEMPERATURE: 98.6 F | RESPIRATION RATE: 20 BRPM | SYSTOLIC BLOOD PRESSURE: 134 MMHG | HEART RATE: 90 BPM | WEIGHT: 315 LBS | HEIGHT: 69 IN

## 2023-08-07 DIAGNOSIS — L71.9 ROSACEA: ICD-10-CM

## 2023-08-07 DIAGNOSIS — E66.01 CLASS 3 SEVERE OBESITY DUE TO EXCESS CALORIES WITHOUT SERIOUS COMORBIDITY WITH BODY MASS INDEX (BMI) OF 50.0 TO 59.9 IN ADULT (HCC): ICD-10-CM

## 2023-08-07 DIAGNOSIS — I10 ESSENTIAL HYPERTENSION: Primary | ICD-10-CM

## 2023-08-07 DIAGNOSIS — L98.9 SKIN LESION OF FACE: ICD-10-CM

## 2023-08-07 DIAGNOSIS — R73.03 PREDIABETES: ICD-10-CM

## 2023-08-07 DIAGNOSIS — I89.0 LYMPHEDEMA OF BOTH LOWER EXTREMITIES: ICD-10-CM

## 2023-08-07 LAB — HBA1C MFR BLD: 6.1 %

## 2023-08-07 PROCEDURE — 3079F DIAST BP 80-89 MM HG: CPT | Performed by: STUDENT IN AN ORGANIZED HEALTH CARE EDUCATION/TRAINING PROGRAM

## 2023-08-07 PROCEDURE — 3075F SYST BP GE 130 - 139MM HG: CPT | Performed by: STUDENT IN AN ORGANIZED HEALTH CARE EDUCATION/TRAINING PROGRAM

## 2023-08-07 PROCEDURE — G8417 CALC BMI ABV UP PARAM F/U: HCPCS | Performed by: STUDENT IN AN ORGANIZED HEALTH CARE EDUCATION/TRAINING PROGRAM

## 2023-08-07 PROCEDURE — 99214 OFFICE O/P EST MOD 30 MIN: CPT | Performed by: STUDENT IN AN ORGANIZED HEALTH CARE EDUCATION/TRAINING PROGRAM

## 2023-08-07 PROCEDURE — 83037 HB GLYCOSYLATED A1C HOME DEV: CPT | Performed by: STUDENT IN AN ORGANIZED HEALTH CARE EDUCATION/TRAINING PROGRAM

## 2023-08-07 PROCEDURE — 3017F COLORECTAL CA SCREEN DOC REV: CPT | Performed by: STUDENT IN AN ORGANIZED HEALTH CARE EDUCATION/TRAINING PROGRAM

## 2023-08-07 PROCEDURE — G8427 DOCREV CUR MEDS BY ELIG CLIN: HCPCS | Performed by: STUDENT IN AN ORGANIZED HEALTH CARE EDUCATION/TRAINING PROGRAM

## 2023-08-07 PROCEDURE — 4004F PT TOBACCO SCREEN RCVD TLK: CPT | Performed by: STUDENT IN AN ORGANIZED HEALTH CARE EDUCATION/TRAINING PROGRAM

## 2023-08-07 RX ORDER — METFORMIN HYDROCHLORIDE 500 MG/1
500 TABLET, EXTENDED RELEASE ORAL
Qty: 60 TABLET | Refills: 3 | Status: SHIPPED | OUTPATIENT
Start: 2023-08-07

## 2023-08-07 RX ORDER — AMLODIPINE BESYLATE 5 MG/1
5 TABLET ORAL DAILY
Qty: 30 TABLET | Refills: 3 | Status: CANCELLED | OUTPATIENT
Start: 2023-08-07

## 2023-08-07 RX ORDER — LISINOPRIL 10 MG/1
10 TABLET ORAL DAILY
Qty: 30 TABLET | Refills: 3 | Status: SHIPPED | OUTPATIENT
Start: 2023-08-07

## 2023-08-07 ASSESSMENT — ENCOUNTER SYMPTOMS
NAUSEA: 0
DIARRHEA: 1
ABDOMINAL PAIN: 1
BLOOD IN STOOL: 0
ABDOMINAL DISTENTION: 1
SHORTNESS OF BREATH: 0
CONSTIPATION: 0
VOMITING: 0

## 2023-08-07 NOTE — PROGRESS NOTES
ESTABLISHED PRIMARY CARE VISIT    23  Name: Sinan Hawk   : 1965   Age: 56 y.o.   Sex: male        Assessment & Plan:     Problem List Items Addressed This Visit          Circulatory    Essential hypertension - Primary     Chronic, controlled  Possible side effects with amlodipine  Trial lisinopril 10 mg daily           Relevant Medications    lisinopril (PRINIVIL;ZESTRIL) 10 MG tablet       Musculoskeletal and Integument    Rosacea     Previously on topical minocycline, no longer covered by insurance  Referral to derm           Relevant Orders    Caro Horvath DO, Dermatology, [place] ([facility])    Skin lesion of face     Referral again to derm         Relevant Orders    Caro Horvath DO, Dermatology, [place] ([facility])       Other    Class 3 severe obesity due to excess calories without serious comorbidity with body mass index (BMI) of 50.0 to 59.9 in adult [facility])     May have lost weight however with increased swelling in lower extremities  Continue metformin  mg twice daily, no changes in chronic diarrhea with this  Continue lifestyle changes           Relevant Medications    metFORMIN (GLUCOPHAGE XR) 500 MG extended release tablet    Other Relevant Orders    POCT glycosylated hemoglobin (Hb A1C) (Completed)    Lymphedema of both lower extremities     Chronic with skin changes, left more severe than right  Unable to fully examine today due to patient's pants  Stop amlodipine  Patient actively working on weight loss  Referral to lymphedema clinic           Relevant Orders    External Referral To Lymphedema Clinic    Prediabetes     Chronic, stable with A1c 6.1 on metformin  Continue metformin  mg twice daily           Relevant Medications    metFORMIN (GLUCOPHAGE XR) 500 MG extended release tablet    Other Relevant Orders    POCT glycosylated hemoglobin (Hb A1C) (Completed)     Counseled patient regarding above diagnosis, including possible risks and complications, especially if

## 2023-08-07 NOTE — ASSESSMENT & PLAN NOTE
Chronic with skin changes, left more severe than right  Unable to fully examine today due to patient's pants  Stop amlodipine  Patient actively working on weight loss  Referral to lymphedema clinic

## 2023-08-07 NOTE — ASSESSMENT & PLAN NOTE
May have lost weight however with increased swelling in lower extremities  Continue metformin  mg twice daily, no changes in chronic diarrhea with this  Continue lifestyle changes

## 2023-08-21 ENCOUNTER — TELEPHONE (OUTPATIENT)
Dept: PRIMARY CARE CLINIC | Age: 58
End: 2023-08-21

## 2023-08-21 NOTE — TELEPHONE ENCOUNTER
Last Appointment:  8/7/2023  Future Appointments   Date Time Provider [address]   11/15/2023  8:00 AM Vilma Haddad MD [facility]      Patient called he has not heard from [facility] Lymphedema therapy yet. Called [facility] Therapy spoke with Fabi Huggins. She reports they do not have referral.      Send again.     Electronically signed by Katya Rhodes LPN on 8/11/4297 at 90:02 AM

## 2023-09-07 ENCOUNTER — TELEPHONE (OUTPATIENT)
Dept: PRIMARY CARE CLINIC | Age: 58
End: 2023-09-07

## 2023-09-07 DIAGNOSIS — I89.0 LYMPHEDEMA OF BOTH LOWER EXTREMITIES: Primary | ICD-10-CM

## 2023-09-07 NOTE — TELEPHONE ENCOUNTER
Omayra Rodriguez stopped in office requesting an order be sent the [facility] for compression socks.    20-30 mmhg - compression

## 2023-09-11 ENCOUNTER — TELEPHONE (OUTPATIENT)
Dept: PRIMARY CARE CLINIC | Age: 58
End: 2023-09-11

## 2023-09-11 NOTE — TELEPHONE ENCOUNTER
----- Message from [employer] Joe sent at 9/11/2023  2:34 PM EDT -----  Subject: Message to Provider    QUESTIONS  Information for Provider? Pt is calling back in he said he stopped in on   Thursday to drop off order fro compression socks. P wants to know if they   were sent over yet or what the status of the order was. Please call pt   back to update him. He said he needs this in order to start his therapy. Pt said he needs rx so insurance will cover. ---------------------------------------------------------------------------  --------------  Raysa PINTO  [phone]; OK to leave message on voicemail  ---------------------------------------------------------------------------  --------------  SCRIPT ANSWERS  Relationship to Patient?  Self

## 2023-11-08 ENCOUNTER — TELEPHONE (OUTPATIENT)
Dept: PRIMARY CARE CLINIC | Age: 58
End: 2023-11-08

## 2023-11-08 NOTE — TELEPHONE ENCOUNTER
Patient came in today stating he is summoned for jury duty and would like a letter to excuse him. He states he has back pain and cannot sit for long periods of time.

## 2023-11-15 ENCOUNTER — OFFICE VISIT (OUTPATIENT)
Dept: PRIMARY CARE CLINIC | Age: 58
End: 2023-11-15
Payer: COMMERCIAL

## 2023-11-15 VITALS
RESPIRATION RATE: 20 BRPM | WEIGHT: 315 LBS | OXYGEN SATURATION: 94 % | BODY MASS INDEX: 46.65 KG/M2 | HEART RATE: 80 BPM | DIASTOLIC BLOOD PRESSURE: 80 MMHG | SYSTOLIC BLOOD PRESSURE: 126 MMHG | TEMPERATURE: 98.7 F | HEIGHT: 69 IN

## 2023-11-15 DIAGNOSIS — L98.9 SKIN LESION OF FACE: ICD-10-CM

## 2023-11-15 DIAGNOSIS — Z53.20 LUNG CANCER SCREENING DECLINED BY PATIENT: ICD-10-CM

## 2023-11-15 DIAGNOSIS — Z23 NEED FOR IMMUNIZATION AGAINST INFLUENZA: ICD-10-CM

## 2023-11-15 DIAGNOSIS — I10 ESSENTIAL HYPERTENSION: ICD-10-CM

## 2023-11-15 DIAGNOSIS — R73.03 PREDIABETES: ICD-10-CM

## 2023-11-15 DIAGNOSIS — E66.01 CLASS 3 SEVERE OBESITY DUE TO EXCESS CALORIES WITHOUT SERIOUS COMORBIDITY WITH BODY MASS INDEX (BMI) OF 50.0 TO 59.9 IN ADULT (HCC): ICD-10-CM

## 2023-11-15 DIAGNOSIS — L21.9 SEBORRHEIC DERMATITIS: ICD-10-CM

## 2023-11-15 DIAGNOSIS — K76.0 FATTY LIVER: ICD-10-CM

## 2023-11-15 DIAGNOSIS — Z12.5 SCREENING FOR PROSTATE CANCER: ICD-10-CM

## 2023-11-15 DIAGNOSIS — I89.0 LYMPHEDEMA OF BOTH LOWER EXTREMITIES: Primary | ICD-10-CM

## 2023-11-15 DIAGNOSIS — Z13.220 SCREENING FOR LIPID DISORDERS: ICD-10-CM

## 2023-11-15 PROCEDURE — 90471 IMMUNIZATION ADMIN: CPT | Performed by: STUDENT IN AN ORGANIZED HEALTH CARE EDUCATION/TRAINING PROGRAM

## 2023-11-15 PROCEDURE — 3017F COLORECTAL CA SCREEN DOC REV: CPT | Performed by: STUDENT IN AN ORGANIZED HEALTH CARE EDUCATION/TRAINING PROGRAM

## 2023-11-15 PROCEDURE — 4004F PT TOBACCO SCREEN RCVD TLK: CPT | Performed by: STUDENT IN AN ORGANIZED HEALTH CARE EDUCATION/TRAINING PROGRAM

## 2023-11-15 PROCEDURE — 90674 CCIIV4 VAC NO PRSV 0.5 ML IM: CPT | Performed by: STUDENT IN AN ORGANIZED HEALTH CARE EDUCATION/TRAINING PROGRAM

## 2023-11-15 PROCEDURE — G8417 CALC BMI ABV UP PARAM F/U: HCPCS | Performed by: STUDENT IN AN ORGANIZED HEALTH CARE EDUCATION/TRAINING PROGRAM

## 2023-11-15 PROCEDURE — 99214 OFFICE O/P EST MOD 30 MIN: CPT | Performed by: STUDENT IN AN ORGANIZED HEALTH CARE EDUCATION/TRAINING PROGRAM

## 2023-11-15 PROCEDURE — G8482 FLU IMMUNIZE ORDER/ADMIN: HCPCS | Performed by: STUDENT IN AN ORGANIZED HEALTH CARE EDUCATION/TRAINING PROGRAM

## 2023-11-15 PROCEDURE — 3074F SYST BP LT 130 MM HG: CPT | Performed by: STUDENT IN AN ORGANIZED HEALTH CARE EDUCATION/TRAINING PROGRAM

## 2023-11-15 PROCEDURE — G8427 DOCREV CUR MEDS BY ELIG CLIN: HCPCS | Performed by: STUDENT IN AN ORGANIZED HEALTH CARE EDUCATION/TRAINING PROGRAM

## 2023-11-15 PROCEDURE — 3079F DIAST BP 80-89 MM HG: CPT | Performed by: STUDENT IN AN ORGANIZED HEALTH CARE EDUCATION/TRAINING PROGRAM

## 2023-11-15 RX ORDER — METFORMIN HYDROCHLORIDE 500 MG/1
500 TABLET, EXTENDED RELEASE ORAL
Qty: 60 TABLET | Refills: 3 | Status: SHIPPED | OUTPATIENT
Start: 2023-11-15

## 2023-11-15 RX ORDER — LISINOPRIL 10 MG/1
10 TABLET ORAL DAILY
Qty: 30 TABLET | Refills: 3 | Status: SHIPPED | OUTPATIENT
Start: 2023-11-15

## 2023-11-15 RX ORDER — KETOCONAZOLE 20 MG/G
CREAM TOPICAL
Qty: 60 G | Refills: 0 | Status: SHIPPED | OUTPATIENT
Start: 2023-11-15

## 2023-11-15 ASSESSMENT — ENCOUNTER SYMPTOMS
SHORTNESS OF BREATH: 0
ABDOMINAL PAIN: 1
VOMITING: 0
CONSTIPATION: 0
ABDOMINAL DISTENTION: 1
BLOOD IN STOOL: 0
NAUSEA: 0
DIARRHEA: 1

## 2023-11-15 NOTE — ASSESSMENT & PLAN NOTE
Chronic with skin changes, left more severe than right  Unable to fully examine today due to patient's pants  Improved per patient  Following with lymphedema clinic  Patient actively working on weight loss

## 2023-11-15 NOTE — ASSESSMENT & PLAN NOTE
Chronic, minimally improved  Continue working on healthy lifestyle changes  Continue metformin  mg twice daily given concurrent prediabetes

## 2023-11-15 NOTE — PROGRESS NOTES
ESTABLISHED PRIMARY CARE VISIT    11/15/23  Name: Dimitri Flynn   : 1965   Age: 56 y.o.   Sex: male        Assessment & Plan:     Problem List Items Addressed This Visit          Circulatory    Essential hypertension     Chronic, controlled  Continue lisinopril 10 mg daily  Recheck electrolytes, kidney function         Relevant Medications    lisinopril (PRINIVIL;ZESTRIL) 10 MG tablet    Other Relevant Orders    Comprehensive Metabolic Panel    Microalbumin, Ur       Digestive    Fatty liver     Following with GI         Relevant Orders    Comprehensive Metabolic Panel       Musculoskeletal and Integument    Skin lesion of face     Has not yet seen derm         Seborrheic dermatitis     Suspect in addition to rosacea  Trial topical ketoconazole         Relevant Medications    ketoconazole (NIZORAL) 2 % cream       Other    Class 3 severe obesity due to excess calories without serious comorbidity with body mass index (BMI) of 50.0 to 59.9 in adult (HCC)     Chronic, minimally improved  Continue working on healthy lifestyle changes  Continue metformin  mg twice daily given concurrent prediabetes         Relevant Medications    metFORMIN (GLUCOPHAGE XR) 500 MG extended release tablet    Other Relevant Orders    Lipid Panel    Comprehensive Metabolic Panel    CBC    Lymphedema of both lower extremities - Primary     Chronic with skin changes, left more severe than right  Unable to fully examine today due to patient's pants  Improved per patient  Following with lymphedema clinic  Patient actively working on weight loss         Prediabetes     Chronic, stable  Continue metformin  mg twice daily  Recheck         Relevant Medications    metFORMIN (GLUCOPHAGE XR) 500 MG extended release tablet    Other Relevant Orders    Comprehensive Metabolic Panel    Hemoglobin A1C    Microalbumin, Ur     Other Visit Diagnoses       Screening for lipid disorders        Relevant Orders    Lipid Panel

## 2023-11-22 DIAGNOSIS — E66.01 CLASS 3 SEVERE OBESITY DUE TO EXCESS CALORIES WITHOUT SERIOUS COMORBIDITY WITH BODY MASS INDEX (BMI) OF 50.0 TO 59.9 IN ADULT (HCC): ICD-10-CM

## 2023-11-22 DIAGNOSIS — K76.0 FATTY LIVER: ICD-10-CM

## 2023-11-22 DIAGNOSIS — R73.03 PREDIABETES: ICD-10-CM

## 2023-11-22 DIAGNOSIS — Z13.220 SCREENING FOR LIPID DISORDERS: ICD-10-CM

## 2023-11-22 DIAGNOSIS — I10 ESSENTIAL HYPERTENSION: ICD-10-CM

## 2023-11-22 DIAGNOSIS — Z12.5 SCREENING FOR PROSTATE CANCER: ICD-10-CM

## 2023-11-22 LAB
ALBUMIN SERPL-MCNC: 4.2 G/DL (ref 3.5–5.2)
ALP BLD-CCNC: 84 U/L (ref 40–129)
ALT SERPL-CCNC: 22 U/L (ref 0–40)
ANION GAP SERPL CALCULATED.3IONS-SCNC: 13 MMOL/L (ref 7–16)
AST SERPL-CCNC: 21 U/L (ref 0–39)
BILIRUB SERPL-MCNC: 0.4 MG/DL (ref 0–1.2)
BUN BLDV-MCNC: 12 MG/DL (ref 6–20)
CALCIUM SERPL-MCNC: 9.2 MG/DL (ref 8.6–10.2)
CHLORIDE BLD-SCNC: 101 MMOL/L (ref 98–107)
CHOLESTEROL: 149 MG/DL
CO2: 24 MMOL/L (ref 22–29)
CREAT SERPL-MCNC: 0.8 MG/DL (ref 0.7–1.2)
CREATININE URINE: 155.5 MG/DL (ref 40–278)
GFR SERPL CREATININE-BSD FRML MDRD: >60 ML/MIN/1.73M2
GLUCOSE BLD-MCNC: 89 MG/DL (ref 74–99)
HBA1C MFR BLD: 5.8 % (ref 4–5.6)
HCT VFR BLD CALC: 48.1 % (ref 37–54)
HDLC SERPL-MCNC: 42 MG/DL
HEMOGLOBIN: 15.3 G/DL (ref 12.5–16.5)
LDL CHOLESTEROL: 77 MG/DL
MCH RBC QN AUTO: 28.8 PG (ref 26–35)
MCHC RBC AUTO-ENTMCNC: 31.8 G/DL (ref 32–34.5)
MCV RBC AUTO: 90.4 FL (ref 80–99.9)
MICROALBUMIN/CREAT 24H UR: <12 MG/L (ref 0–19)
MICROALBUMIN/CREAT UR-RTO: NORMAL MCG/MG CREAT (ref 0–30)
PDW BLD-RTO: 13.5 % (ref 11.5–15)
PLATELET # BLD: 287 K/UL (ref 130–450)
PMV BLD AUTO: 9.9 FL (ref 7–12)
POTASSIUM SERPL-SCNC: 4.5 MMOL/L (ref 3.5–5)
PROSTATE SPECIFIC ANTIGEN: 2.09 NG/ML (ref 0–4)
RBC # BLD: 5.32 M/UL (ref 3.8–5.8)
SODIUM BLD-SCNC: 138 MMOL/L (ref 132–146)
TOTAL PROTEIN: 7.4 G/DL (ref 6.4–8.3)
TRIGL SERPL-MCNC: 150 MG/DL
VLDLC SERPL CALC-MCNC: 30 MG/DL
WBC # BLD: 9.1 K/UL (ref 4.5–11.5)

## 2023-12-13 ENCOUNTER — TELEPHONE (OUTPATIENT)
Dept: PRIMARY CARE CLINIC | Age: 58
End: 2023-12-13

## 2023-12-13 NOTE — TELEPHONE ENCOUNTER
Message [facility] today. Patient seen at [facility].

## 2023-12-13 NOTE — TELEPHONE ENCOUNTER
----- Message from Maycoljose Cain sent at 12/13/2023  9:50 AM EST -----  Subject: Appointment Request    Reason for Call: Established Patient Appointment needed: Urgent (Patient   Request) No Script    QUESTIONS    Reason for appointment request? Available appointments did not meet   patient need     Additional Information for Provider? Patient is requesting an appointment. He has lymphedema in the LEFT leg and needs a brace. Physical therapist   cannot get the brace, it requires a RX. He would like to see you in   office. It would make his life easier, Please advise.  Thank you   ---------------------------------------------------------------------------  --------------  Olvin RABAGO  [phone]; OK to leave message on voicemail  ---------------------------------------------------------------------------  --------------  SCRIPT ANSWERS

## 2023-12-15 NOTE — TELEPHONE ENCOUNTER
Is the brace for the lymphedema? I can write him one based on our last visit because we addressed his lymphedema then.

## 2023-12-15 NOTE — TELEPHONE ENCOUNTER
Yes it is a brace for the lymphedema. He states he has a picture of the one he  needs. He stated he does not recall showing that to you. I told him he can upload a picture on My chart to show you if that is what he needs.  He asked me to call him when order is put in and sent to [facility] [place], operated by [facility]

## 2024-06-06 ENCOUNTER — OFFICE VISIT (OUTPATIENT)
Dept: FAMILY MEDICINE CLINIC | Age: 59
End: 2024-06-06
Payer: COMMERCIAL

## 2024-06-06 VITALS
SYSTOLIC BLOOD PRESSURE: 134 MMHG | BODY MASS INDEX: 46.65 KG/M2 | HEIGHT: 69 IN | HEART RATE: 82 BPM | TEMPERATURE: 97.1 F | RESPIRATION RATE: 18 BRPM | OXYGEN SATURATION: 95 % | DIASTOLIC BLOOD PRESSURE: 80 MMHG | WEIGHT: 315 LBS

## 2024-06-06 DIAGNOSIS — R10.31 RLQ ABDOMINAL PAIN: ICD-10-CM

## 2024-06-06 DIAGNOSIS — R10.9 RIGHT FLANK PAIN: Primary | ICD-10-CM

## 2024-06-06 PROCEDURE — 3075F SYST BP GE 130 - 139MM HG: CPT | Performed by: NURSE PRACTITIONER

## 2024-06-06 PROCEDURE — 99214 OFFICE O/P EST MOD 30 MIN: CPT | Performed by: NURSE PRACTITIONER

## 2024-06-06 PROCEDURE — 3079F DIAST BP 80-89 MM HG: CPT | Performed by: NURSE PRACTITIONER

## 2024-06-06 NOTE — PROGRESS NOTES
24  Elias Hirsch : 1965 Sex: male  Age 58 y.o.    Subjective:  Chief Complaint   Patient presents with    Back Pain       HPI:   Elias Hirsch , 58 y.o. male presents to the clinic for evaluation of constant right flank pain x 2 days. The patient reports associated nausea / vomiting and radiating abdominal pain. The patient denies known injury. The patient denies symptoms worsen with activity / movement. The patient has taken Tylenol for symptoms. The patient reports unchanged symptoms over time. The patient denies hematuria, dysuria, and radiating pain to the abdomen. The patient also denies headache, fever, chest pain, abdominal pain, shortness of breath, and diarrhea.    ROS:   Unless otherwise stated in this report the patient's positive and negative responses for review of systems for constitutional, eyes, ENT, cardiovascular, respiratory, gastrointestinal, neurological, , musculoskeletal, and integument systems and related systems to the presenting problem are either stated in the history of present illness or were not pertinent or were negative for the symptoms and/or complaints related to the presenting medical problem.  Positives and pertinent negatives as per HPI.  All others reviewed and are negative.      PMH:     Past Medical History:   Diagnosis Date    Back problem        Past Surgical History:   Procedure Laterality Date    ANKLE SURGERY Right     due to instability    MOUTH SURGERY         Family History   Problem Relation Age of Onset    Kidney Disease Daughter     No Known Problems Son     Cancer Maternal Aunt        Medications:     Current Outpatient Medications:     lisinopril (PRINIVIL;ZESTRIL) 10 MG tablet, Take 1 tablet by mouth daily, Disp: 30 tablet, Rfl: 3    metFORMIN (GLUCOPHAGE XR) 500 MG extended release tablet, Take 1 tablet by mouth 2 times daily (before meals), Disp: 60 tablet, Rfl: 3    ketoconazole (NIZORAL) 2 % cream, Apply topically daily to face for

## 2024-08-07 ENCOUNTER — OFFICE VISIT (OUTPATIENT)
Dept: PRIMARY CARE CLINIC | Age: 59
End: 2024-08-07
Payer: COMMERCIAL

## 2024-08-07 VITALS
OXYGEN SATURATION: 96 % | WEIGHT: 315 LBS | RESPIRATION RATE: 20 BRPM | HEART RATE: 75 BPM | DIASTOLIC BLOOD PRESSURE: 92 MMHG | SYSTOLIC BLOOD PRESSURE: 156 MMHG | TEMPERATURE: 97.6 F | BODY MASS INDEX: 46.65 KG/M2 | HEIGHT: 69 IN

## 2024-08-07 DIAGNOSIS — K29.90 GASTRITIS AND DUODENITIS: ICD-10-CM

## 2024-08-07 DIAGNOSIS — I10 ESSENTIAL HYPERTENSION: Primary | ICD-10-CM

## 2024-08-07 DIAGNOSIS — I89.0 LYMPHEDEMA OF BOTH LOWER EXTREMITIES: ICD-10-CM

## 2024-08-07 DIAGNOSIS — K76.0 FATTY LIVER: ICD-10-CM

## 2024-08-07 DIAGNOSIS — K42.9 UMBILICAL HERNIA WITHOUT OBSTRUCTION AND WITHOUT GANGRENE: ICD-10-CM

## 2024-08-07 DIAGNOSIS — E66.01 CLASS 3 SEVERE OBESITY DUE TO EXCESS CALORIES WITHOUT SERIOUS COMORBIDITY WITH BODY MASS INDEX (BMI) OF 50.0 TO 59.9 IN ADULT (HCC): ICD-10-CM

## 2024-08-07 DIAGNOSIS — R10.13 EPIGASTRIC PAIN: ICD-10-CM

## 2024-08-07 DIAGNOSIS — R73.03 PREDIABETES: ICD-10-CM

## 2024-08-07 LAB — HBA1C MFR BLD: 5.9 %

## 2024-08-07 PROCEDURE — 3080F DIAST BP >= 90 MM HG: CPT | Performed by: STUDENT IN AN ORGANIZED HEALTH CARE EDUCATION/TRAINING PROGRAM

## 2024-08-07 PROCEDURE — G2211 COMPLEX E/M VISIT ADD ON: HCPCS | Performed by: STUDENT IN AN ORGANIZED HEALTH CARE EDUCATION/TRAINING PROGRAM

## 2024-08-07 PROCEDURE — 99214 OFFICE O/P EST MOD 30 MIN: CPT | Performed by: STUDENT IN AN ORGANIZED HEALTH CARE EDUCATION/TRAINING PROGRAM

## 2024-08-07 PROCEDURE — 3077F SYST BP >= 140 MM HG: CPT | Performed by: STUDENT IN AN ORGANIZED HEALTH CARE EDUCATION/TRAINING PROGRAM

## 2024-08-07 PROCEDURE — 83036 HEMOGLOBIN GLYCOSYLATED A1C: CPT | Performed by: STUDENT IN AN ORGANIZED HEALTH CARE EDUCATION/TRAINING PROGRAM

## 2024-08-07 RX ORDER — METFORMIN HYDROCHLORIDE 500 MG/1
500 TABLET, EXTENDED RELEASE ORAL
Qty: 60 TABLET | Refills: 3 | Status: SHIPPED | OUTPATIENT
Start: 2024-08-07

## 2024-08-07 RX ORDER — OMEPRAZOLE 40 MG/1
40 CAPSULE, DELAYED RELEASE ORAL
Qty: 30 CAPSULE | Refills: 3 | Status: SHIPPED | OUTPATIENT
Start: 2024-08-07

## 2024-08-07 RX ORDER — LISINOPRIL 10 MG/1
10 TABLET ORAL DAILY
Qty: 30 TABLET | Refills: 3 | Status: SHIPPED | OUTPATIENT
Start: 2024-08-07

## 2024-08-07 SDOH — ECONOMIC STABILITY: FOOD INSECURITY: WITHIN THE PAST 12 MONTHS, YOU WORRIED THAT YOUR FOOD WOULD RUN OUT BEFORE YOU GOT MONEY TO BUY MORE.: NEVER TRUE

## 2024-08-07 SDOH — ECONOMIC STABILITY: FOOD INSECURITY: WITHIN THE PAST 12 MONTHS, THE FOOD YOU BOUGHT JUST DIDN'T LAST AND YOU DIDN'T HAVE MONEY TO GET MORE.: NEVER TRUE

## 2024-08-07 SDOH — ECONOMIC STABILITY: INCOME INSECURITY: HOW HARD IS IT FOR YOU TO PAY FOR THE VERY BASICS LIKE FOOD, HOUSING, MEDICAL CARE, AND HEATING?: NOT HARD AT ALL

## 2024-08-07 ASSESSMENT — PATIENT HEALTH QUESTIONNAIRE - PHQ9
2. FEELING DOWN, DEPRESSED OR HOPELESS: NOT AT ALL
SUM OF ALL RESPONSES TO PHQ9 QUESTIONS 1 & 2: 0
SUM OF ALL RESPONSES TO PHQ QUESTIONS 1-9: 0
1. LITTLE INTEREST OR PLEASURE IN DOING THINGS: NOT AT ALL

## 2024-08-07 NOTE — ASSESSMENT & PLAN NOTE
Chronic, worsened, patient feels due to worsening lymphedema  Continue working on healthy lifestyle changes  Continue metformin  mg twice daily given concurrent prediabetes

## 2024-08-07 NOTE — ASSESSMENT & PLAN NOTE
Chronic with skin changes, left more severe than right  Referral to new lymphedema clinic  Counseled on weight loss

## 2024-08-07 NOTE — PROGRESS NOTES
ESTABLISHED PRIMARY CARE VISIT    24  Name: Elias Hirsch   : 1965   Age: 58 y.o.  Sex: male        Assessment & Plan:     Problem List Items Addressed This Visit       Class 3 severe obesity due to excess calories without serious comorbidity with body mass index (BMI) of 50.0 to 59.9 in adult (HCC)     Chronic, worsened, patient feels due to worsening lymphedema  Continue working on healthy lifestyle changes  Continue metformin  mg twice daily given concurrent prediabetes         Relevant Medications    metFORMIN (GLUCOPHAGE XR) 500 MG extended release tablet    Lymphedema of both lower extremities     Chronic with skin changes, left more severe than right  Referral to new lymphedema clinic  Counseled on weight loss         Relevant Orders    [facility] [place] [facility]    Essential hypertension - Primary     Chronic, uncontrolled  Restart lisinopril 10 mg daily  Reviewed recent labs from GI         Relevant Medications    lisinopril (PRINIVIL;ZESTRIL) 10 MG tablet    Fatty liver     Following with GI         Prediabetes     Chronic, stable  Continue metformin  mg twice daily  Recheck         Relevant Medications    metFORMIN (GLUCOPHAGE XR) 500 MG extended release tablet    Other Relevant Orders    POCT glycosylated hemoglobin (Hb A1C) (Completed)    Gastritis and duodenitis     Seen on EGD  Following with GI  Continue omeprazole 40 mg daily         Relevant Medications    omeprazole (PRILOSEC) 40 MG delayed release capsule    Umbilical hernia without obstruction and without gangrene     Small fat-containing hernia  Surgical intervention not necessary at this time          Other Visit Diagnoses       Epigastric pain        May be related to gastritis and duodenitis vs gallbladder hyperkinesis with enterogastric reflux  Following with GI          Counseled patient regarding above diagnosis, including possible risks and complications, especially if left

## 2024-08-14 PROBLEM — K42.9 UMBILICAL HERNIA WITHOUT OBSTRUCTION AND WITHOUT GANGRENE: Status: ACTIVE | Noted: 2024-08-14

## 2024-08-14 PROBLEM — K29.90 GASTRITIS AND DUODENITIS: Status: ACTIVE | Noted: 2024-08-14

## 2025-08-15 ENCOUNTER — OFFICE VISIT (OUTPATIENT)
Dept: PRIMARY CARE CLINIC | Age: 60
End: 2025-08-15
Payer: COMMERCIAL

## 2025-08-15 VITALS
DIASTOLIC BLOOD PRESSURE: 82 MMHG | HEART RATE: 84 BPM | RESPIRATION RATE: 20 BRPM | HEIGHT: 69 IN | WEIGHT: 315 LBS | OXYGEN SATURATION: 97 % | TEMPERATURE: 98.1 F | SYSTOLIC BLOOD PRESSURE: 128 MMHG | BODY MASS INDEX: 46.65 KG/M2

## 2025-08-15 DIAGNOSIS — I10 ESSENTIAL HYPERTENSION: ICD-10-CM

## 2025-08-15 DIAGNOSIS — R73.03 PREDIABETES: ICD-10-CM

## 2025-08-15 DIAGNOSIS — M54.2 NECK PAIN: ICD-10-CM

## 2025-08-15 DIAGNOSIS — E66.813 CLASS 3 SEVERE OBESITY DUE TO EXCESS CALORIES WITHOUT SERIOUS COMORBIDITY WITH BODY MASS INDEX (BMI) OF 50.0 TO 59.9 IN ADULT (HCC): ICD-10-CM

## 2025-08-15 DIAGNOSIS — G44.84 EXERTIONAL HEADACHE: Primary | ICD-10-CM

## 2025-08-15 DIAGNOSIS — R21 TARGET RASH: ICD-10-CM

## 2025-08-15 DIAGNOSIS — K76.0 FATTY LIVER: ICD-10-CM

## 2025-08-15 DIAGNOSIS — G44.84 EXERTIONAL HEADACHE: ICD-10-CM

## 2025-08-15 DIAGNOSIS — R68.84 JAW PAIN: ICD-10-CM

## 2025-08-15 DIAGNOSIS — N50.811 RIGHT TESTICULAR PAIN: ICD-10-CM

## 2025-08-15 LAB
ALBUMIN: 3.8 G/DL (ref 3.5–5.2)
ALP BLD-CCNC: 96 U/L (ref 40–129)
ALT SERPL-CCNC: 19 U/L (ref 0–50)
ANION GAP SERPL CALCULATED.3IONS-SCNC: 13 MMOL/L (ref 7–16)
AST SERPL-CCNC: 30 U/L (ref 0–50)
BASOPHILS ABSOLUTE: 0.11 K/UL (ref 0–0.2)
BASOPHILS RELATIVE PERCENT: 1 % (ref 0–2)
BILIRUB SERPL-MCNC: 0.4 MG/DL (ref 0–1.2)
BUN BLDV-MCNC: 13 MG/DL (ref 6–20)
C-REACTIVE PROTEIN: 60.6 MG/L (ref 0–5)
CALCIUM SERPL-MCNC: 9.6 MG/DL (ref 8.6–10)
CHLORIDE BLD-SCNC: 97 MMOL/L (ref 98–107)
CHOLESTEROL, TOTAL: 106 MG/DL
CO2: 25 MMOL/L (ref 22–29)
CREAT SERPL-MCNC: 1 MG/DL (ref 0.7–1.2)
CREATININE URINE: 107 MG/DL (ref 40–278)
EOSINOPHILS ABSOLUTE: 0.57 K/UL (ref 0.05–0.5)
EOSINOPHILS RELATIVE PERCENT: 6 % (ref 0–6)
GFR, ESTIMATED: 86 ML/MIN/1.73M2
GLUCOSE BLD-MCNC: 90 MG/DL (ref 74–99)
HBA1C MFR BLD: 6.6 % (ref 4–5.6)
HCT VFR BLD CALC: 44.4 % (ref 37–54)
HDLC SERPL-MCNC: 47 MG/DL
HEMOGLOBIN: 13.2 G/DL (ref 12.5–16.5)
IMMATURE GRANULOCYTES %: 1 % (ref 0–5)
IMMATURE GRANULOCYTES ABSOLUTE: 0.06 K/UL (ref 0–0.58)
LDL CHOLESTEROL: 43 MG/DL
LYMPHOCYTES ABSOLUTE: 1.63 K/UL (ref 1.5–4)
LYMPHOCYTES RELATIVE PERCENT: 17 % (ref 20–42)
MCH RBC QN AUTO: 26.7 PG (ref 26–35)
MCHC RBC AUTO-ENTMCNC: 29.7 G/DL (ref 32–34.5)
MCV RBC AUTO: 89.9 FL (ref 80–99.9)
MICROALBUMIN/CREAT 24H UR: 18 MG/L (ref 0–20)
MICROALBUMIN/CREAT UR-RTO: 17 MCG/MG CREAT (ref 0–30)
MONOCYTES ABSOLUTE: 1.17 K/UL (ref 0.1–0.95)
MONOCYTES RELATIVE PERCENT: 12 % (ref 2–12)
NEUTROPHILS ABSOLUTE: 6.31 K/UL (ref 1.8–7.3)
NEUTROPHILS RELATIVE PERCENT: 64 % (ref 43–80)
PDW BLD-RTO: 13.3 % (ref 11.5–15)
PLATELET # BLD: 241 K/UL (ref 130–450)
PMV BLD AUTO: 10.4 FL (ref 7–12)
POTASSIUM SERPL-SCNC: 4.6 MMOL/L (ref 3.5–5.1)
RBC # BLD: 4.94 M/UL (ref 3.8–5.8)
SODIUM BLD-SCNC: 135 MMOL/L (ref 136–145)
TOTAL PROTEIN: 7.7 G/DL (ref 6.4–8.3)
TRIGL SERPL-MCNC: 83 MG/DL
TSH SERPL DL<=0.05 MIU/L-ACNC: 2.26 UIU/ML (ref 0.27–4.2)
VLDLC SERPL CALC-MCNC: 17 MG/DL
WBC # BLD: 9.9 K/UL (ref 4.5–11.5)

## 2025-08-15 PROCEDURE — 3074F SYST BP LT 130 MM HG: CPT | Performed by: STUDENT IN AN ORGANIZED HEALTH CARE EDUCATION/TRAINING PROGRAM

## 2025-08-15 PROCEDURE — 99214 OFFICE O/P EST MOD 30 MIN: CPT | Performed by: STUDENT IN AN ORGANIZED HEALTH CARE EDUCATION/TRAINING PROGRAM

## 2025-08-15 PROCEDURE — 3079F DIAST BP 80-89 MM HG: CPT | Performed by: STUDENT IN AN ORGANIZED HEALTH CARE EDUCATION/TRAINING PROGRAM

## 2025-08-15 RX ORDER — LISINOPRIL 10 MG/1
10 TABLET ORAL DAILY
Qty: 30 TABLET | Refills: 3 | Status: SHIPPED | OUTPATIENT
Start: 2025-08-15

## 2025-08-15 RX ORDER — METFORMIN HYDROCHLORIDE 500 MG/1
500 TABLET, EXTENDED RELEASE ORAL
Qty: 60 TABLET | Refills: 3 | Status: SHIPPED | OUTPATIENT
Start: 2025-08-15

## 2025-08-16 LAB — SED RATE, AUTOMATED: 24 MM/HR (ref 0–15)

## 2025-08-18 LAB — BORRELIA BURGDORFERI ABS TOTAL: 0.27 IV

## 2025-08-20 ENCOUNTER — TELEPHONE (OUTPATIENT)
Dept: PRIMARY CARE CLINIC | Age: 60
End: 2025-08-20

## 2025-08-29 PROBLEM — E11.9 TYPE 2 DIABETES MELLITUS (HCC): Status: ACTIVE | Noted: 2023-01-27
